# Patient Record
Sex: MALE | Race: WHITE | NOT HISPANIC OR LATINO | Employment: UNEMPLOYED | URBAN - METROPOLITAN AREA
[De-identification: names, ages, dates, MRNs, and addresses within clinical notes are randomized per-mention and may not be internally consistent; named-entity substitution may affect disease eponyms.]

---

## 2018-01-13 NOTE — MISCELLANEOUS
Message   Recorded as Task   Date: 02/01/2016 01:05 PM, Created By: Nurys Queen   Task Name: Medical Record Request   Assigned To: Yoselin Wooten   Regarding Patient: Michele Hansen, Status: Active   CommentUnruly Mills - 01 Feb 2016 1:05 PM     TASK CREATED  Patient contacted office  He is moving out of the area and is requesting his medical records  Patient can be reached @ [phone]   Per task, patient is moving and is requesting his records  Patient requested to be called  I spoke with patient today, he stated he hasn't found a New Pain Specialist yet  I advised patient once he finds a new pain doctor to give me a call and I can fax over the records  Patient stated that was fine and he will call back once he finds one  Active Problems    1  Cervicalgia (723 1) (M54 2)   2  Chronic lumbar radiculopathy (724 4) (M54 16)   3  Displacement of lumbar intervertebral disc without myelopathy (722 10) (M51 26)   4  Limb pain (729 5) (M79 609)   5  Lower back pain (724 2) (M54 5)   6  Lumbar canal stenosis (724 02) (M48 06)   7  Spondylosis of lumbar region without myelopathy or radiculopathy (721 3) (M47 816)    Current Meds   1  Gabapentin 300 MG Oral Capsule; 1 TAB QHS X 3D, THEN 1 TAB BID X 3D, THEN 1   TAB TID; Therapy: [id number] to ([facility])  Requested for: 40DPH6351; Last   [id number] Ordered   2  Hydrocodone-Acetaminophen  MG Oral Tablet; TAKE 1 TABLET EVERY 6   HOURS AS NEEDED FOR PAIN;   Therapy: [id number] to (Evaluate:03Apr2014); Last [id number] Ordered   3  Hydrocodone-Acetaminophen 5-500 MG Oral Tablet; TAKE 1 TABLET DAILY AS   NEEDED FOR PAIN;   Therapy: [id number] to Recorded   4  Hydrocodone-Acetaminophen 5-500 MG Oral Tablet; TAKE 1 TABLET DAILY AS   NEEDED FOR PAIN;   Therapy: 86Ykv9660 to (Evaluate:16Jan2014) Recorded    Allergies    1   No Known Drug Allergies    Signatures   Electronically signed by : Diane Titus, ; Feb 2 2016 10:32AM EST (Author)

## 2018-12-11 ENCOUNTER — OFFICE VISIT (OUTPATIENT)
Dept: URGENT CARE | Facility: CLINIC | Age: 53
End: 2018-12-11
Payer: COMMERCIAL

## 2018-12-11 VITALS
SYSTOLIC BLOOD PRESSURE: 154 MMHG | DIASTOLIC BLOOD PRESSURE: 82 MMHG | OXYGEN SATURATION: 96 % | BODY MASS INDEX: 35.07 KG/M2 | RESPIRATION RATE: 16 BRPM | HEIGHT: 70 IN | TEMPERATURE: 97.5 F | WEIGHT: 245 LBS | HEART RATE: 73 BPM

## 2018-12-11 DIAGNOSIS — J06.9 ACUTE URI: Primary | ICD-10-CM

## 2018-12-11 PROCEDURE — G0382 LEV 3 HOSP TYPE B ED VISIT: HCPCS | Performed by: PHYSICIAN ASSISTANT

## 2018-12-11 RX ORDER — DOXYCYCLINE 100 MG/1
100 TABLET ORAL 2 TIMES DAILY
Qty: 20 TABLET | Refills: 0 | Status: SHIPPED | OUTPATIENT
Start: 2018-12-11 | End: 2018-12-21

## 2018-12-11 RX ORDER — HYDROCODONE BITARTRATE AND ACETAMINOPHEN 10; 325 MG/1; MG/1
1 TABLET ORAL EVERY 6 HOURS PRN
COMMUNITY
Start: 2014-01-17

## 2018-12-11 RX ORDER — ALBUTEROL SULFATE 90 UG/1
1 AEROSOL, METERED RESPIRATORY (INHALATION) 4 TIMES DAILY
Qty: 1 INHALER | Refills: 0 | Status: SHIPPED | OUTPATIENT
Start: 2018-12-11 | End: 2018-12-21

## 2018-12-11 NOTE — PROGRESS NOTES
[facility]        NAME: Olivia Mitchell is a 47 y o  male  : 1965    MRN: [id number]  DATE: 2018  TIME: 4:28 PM    Assessment and Plan   Acute URI [J06 9]  1  Acute URI  doxycycline (ADOXA) 100 MG tablet    albuterol (PROVENTIL HFA,VENTOLIN HFA) 90 mcg/act inhaler     Patient Instructions     Take medicine as prescribed  Follow up with PCP in 3-5 days  Proceed to  ER if symptoms worsen  Chief Complaint     Chief Complaint   Patient presents with    Nasal Congestion     Nasal congestion, cough, sore throat x 1 week  Using mucinex and robitussin without relief  History of Present Illness       URI    This is a new problem  The current episode started yesterday  The problem has been gradually worsening  The maximum temperature recorded prior to his arrival was 101 - 101 9 F  Associated symptoms include congestion, coughing, rhinorrhea and a sore throat  Pertinent negatives include no abdominal pain, chest pain, diarrhea, dysuria, ear pain, headaches, joint pain, joint swelling, nausea, neck pain, plugged ear sensation, rash, sinus pain, sneezing, swollen glands, vomiting or wheezing  He has tried nothing for the symptoms  Review of Systems   Review of Systems   Constitutional: Negative for activity change, appetite change, chills, diaphoresis, fatigue, fever and unexpected weight change  HENT: Positive for congestion, rhinorrhea and sore throat  Negative for ear pain, sinus pain and sneezing  Respiratory: Positive for cough and chest tightness  Negative for apnea, choking, shortness of breath, wheezing and stridor  Cardiovascular: Negative for chest pain  Gastrointestinal: Negative for abdominal pain, diarrhea, nausea and vomiting  Genitourinary: Negative for dysuria  Musculoskeletal: Negative for joint pain and neck pain  Skin: Negative for rash  Neurological: Negative for headaches           Current Medications       Current Outpatient Prescriptions:    HYDROcodone-acetaminophen (NORCO)  mg per tablet, Take 1 tablet by mouth every 6 (six) hours as needed, Disp: , Rfl:     albuterol (PROVENTIL HFA,VENTOLIN HFA) 90 mcg/act inhaler, Inhale 1 puff 4 (four) times a day for 10 days, Disp: 1 Inhaler, Rfl: 0    Diclofenac Sodium  MG 24 hr tablet, Take 100 mg by mouth daily, Disp: , Rfl: 3    doxycycline (ADOXA) 100 MG tablet, Take 1 tablet (100 mg total) by mouth 2 (two) times a day for 10 days, Disp: 20 tablet, Rfl: 0    Current Allergies     Allergies as of 12/11/2018    (No Known Allergies)            The following portions of the patient's history were reviewed and updated as appropriate: allergies, current medications, past family history, past medical history, past social history, past surgical history and problem list      Past Medical History:   Diagnosis Date    Back pain        Past Surgical History:   Procedure Laterality Date    CARPAL TUNNEL RELEASE Bilateral     KNEE SURGERY Bilateral     ROTATOR CUFF REPAIR Bilateral        No family history on file  Medications have been verified  Objective   /82   Pulse 73   Temp 97 5 °F (36 4 °C)   Resp 16   Ht 5' 10" (1 778 m)   Wt 111 kg (245 lb)   SpO2 96%   BMI 35 15 kg/m²        Physical Exam     Physical Exam   Constitutional: He appears well-developed and well-nourished  HENT:   Head: Normocephalic  Right Ear: External ear normal    Left Ear: External ear normal    Nose: Mucosal edema and rhinorrhea present  No nose lacerations  Mouth/Throat: Posterior oropharyngeal erythema present  No oropharyngeal exudate or posterior oropharyngeal edema  Cardiovascular: Normal rate, regular rhythm, normal heart sounds and intact distal pulses  Exam reveals no gallop and no friction rub  No murmur heard  Pulmonary/Chest: Effort normal and breath sounds normal  No respiratory distress  He has no decreased breath sounds  He has no wheezes  He has no rhonchi   He has no rales  Harsh nonproductive cough   Abdominal: Soft  Bowel sounds are normal  He exhibits no distension  There is no tenderness  There is no rebound and no guarding